# Patient Record
Sex: FEMALE | Race: WHITE | NOT HISPANIC OR LATINO | Employment: OTHER | URBAN - METROPOLITAN AREA
[De-identification: names, ages, dates, MRNs, and addresses within clinical notes are randomized per-mention and may not be internally consistent; named-entity substitution may affect disease eponyms.]

---

## 2022-12-15 ENCOUNTER — TREATMENT (OUTPATIENT)
Dept: PHYSICAL THERAPY | Facility: CLINIC | Age: 72
End: 2022-12-15

## 2022-12-15 DIAGNOSIS — M25.562 LEFT KNEE PAIN, UNSPECIFIED CHRONICITY: Primary | ICD-10-CM

## 2022-12-15 PROCEDURE — 97110 THERAPEUTIC EXERCISES: CPT | Performed by: PHYSICAL THERAPIST

## 2022-12-15 PROCEDURE — 97162 PT EVAL MOD COMPLEX 30 MIN: CPT | Performed by: PHYSICAL THERAPIST

## 2022-12-15 NOTE — PROGRESS NOTES
Physical Therapy Initial Evaluation and Plan of Care        Patient: Codie Garcia   : 1950  Diagnosis/ICD-10 Code:  Left knee pain, unspecified chronicity [M25.562]  Referring practitioner: LEENA Ham  Date of Initial Visit: 12/15/2022  Today's Date: 12/15/2022  Patient seen for 1 sessions           Subjective Questionnaire: LEFS: 54%      Subjective Evaluation    History of Present Illness  Mechanism of injury: Patient presents to physical therapy with cc of pain in left knee.  Reports that a few weeks ago she was walking her dog and mis-stepped on a walnut and rolled her ankle and twisted her knee.  Reports that at the time she could not put weight on her left leg due to pain.  States that since pain has decreased some and she can now put weight on her leg with mild pain.  Reports that pain is located in front of knee at times, then other times in on back/medial side of left knee.  Patient also reports intermittent weakness of left leg and pain in left posterior hip.  Patient had x-ray that showed some decreased joint space in left knee.  Wishes to have less pain in left knee with walking her dog.     Pain  Current pain rating: 3  Location: left knee  Quality: dull ache, sharp and discomfort  Relieving factors: ice and medications  Aggravating factors: stairs and ambulation  Progression: improved    Diagnostic Tests  X-ray: normal    Treatments  Previous treatment: medication  Patient Goals  Patient goals for therapy: decreased pain, increased motion, increased strength and return to sport/leisure activities             Objective          Observations   Left Knee   Positive for edema.     Additional Knee Observation Details  Medial joint line    Tenderness   Left Knee   Tenderness in the medial joint line and popliteal fossa.     Active Range of Motion   Left Knee   Flexion: 130 degrees   Extension: 0 degrees     Right Knee   Flexion: 127 degrees   Extension: 0 degrees     Strength/Myotome  Testing     Left Hip   Planes of Motion   Flexion: 4+  External rotation: 5  Internal rotation: 5    Right Hip   Planes of Motion   Flexion: 5  External rotation: 5  Internal rotation: 5    Left Knee   Flexion: 5  Extension: 4+    Right Knee   Normal strength    Left Ankle/Foot   Dorsiflexion: 5    Right Ankle/Foot   Dorsiflexion: 5    Tests     Lumbar     Left   Positive quadrant.     Left Knee   Positive medial Clara.   Negative anterior Lachman, bounce home, posterior drawer and valgus stress test at 30 degrees.           Assessment & Plan     Assessment  Impairments: impaired physical strength, lacks appropriate home exercise program and pain with function  Functional Limitations: walking and uncomfortable because of pain  Assessment details: Patient presents to physical therapy with s/s congruent with left knee pain.  Noted weakness in left knee and pain with movement of left knee. Noted joint line tenderness with left knee flexed.  Patient is appropriate for PT intervention in order to address these deficits so that she may complete ADL's with less pain/limitation.  Prognosis: good    Goals  Plan Goals: In two weeks, patient will report at least 25% reduction in pain level.   In two weeks, patient will demonstrate equal bilateral knee flexion AROM without pain in left knee.     In four weeks, patient will demonstrate 5/5 muscular strength in BLE's.   In four weeks, patient will report tolerating walking the dog without pain for at least 3 consecutive days.  In four weeks, patient will demonstrate proper technique with HEP.   In four weeks, patient will demonstrate decreased perceived disability by increasing score on LEFS by at least 15%.    Plan  Therapy options: will be seen for skilled therapy services  Planned modality interventions: cryotherapy and thermotherapy (hydrocollator packs)  Planned therapy interventions: manual therapy, neuromuscular re-education, soft tissue mobilization, strengthening,  stretching, therapeutic activities, joint mobilization, home exercise program and functional ROM exercises  Frequency: 2x week  Duration in weeks: 6        Manual Therapy:         mins  21944;  Therapeutic Exercise:    15     mins  24172;     Neuromuscular Mery:        mins  52145;    Therapeutic Activity:          mins  86367;     Gait Training:           mins  49439;     Ultrasound:          mins  03535;    Electrical Stimulation:         mins  74783 ( );  Dry Needling          mins self-pay    Timed Treatment:   15   mins   Total Treatment:     45   mins    PT SIGNATURE: Desean Reyes, JACK   DATE TREATMENT INITIATED: 12/15/2022    Initial Certification  Certification Period: 3/15/2023  I certify that the therapy services are furnished while this patient is under my care.  The services outlined above are required by this patient, and will be reviewed every 90 days.     PHYSICIAN: Chandni Hooks, APRN      DATE:     Please sign and return via fax to [phone].. Thank you, [facility].

## 2022-12-21 ENCOUNTER — TREATMENT (OUTPATIENT)
Dept: PHYSICAL THERAPY | Facility: CLINIC | Age: 72
End: 2022-12-21

## 2022-12-21 DIAGNOSIS — M25.562 LEFT KNEE PAIN, UNSPECIFIED CHRONICITY: Primary | ICD-10-CM

## 2022-12-21 PROCEDURE — 97110 THERAPEUTIC EXERCISES: CPT | Performed by: PHYSICAL THERAPIST

## 2022-12-21 PROCEDURE — 97140 MANUAL THERAPY 1/> REGIONS: CPT | Performed by: PHYSICAL THERAPIST

## 2022-12-21 NOTE — PROGRESS NOTES
Physical Therapy Daily Progress Note    Patient: Codie Garcia   : 1950  Diagnosis/ICD-10 Code:  Left knee pain, unspecified chronicity [M25.562]  Referring practitioner: LEENA Ham  Date of Initial Visit: Type: THERAPY  Noted: 12/15/2022  Today's Date: 2022  Patient seen for 2 sessions         Codie Garcia reports:  Left knee feeling well, however pain in left posterior hip and thigh present today.  Reports compliance with HEP, however feels bolster with positional distraction may have been too far forward.     Objective   See Exercise, Manual, and Modality Logs for complete treatment.       Assessment/Plan     Tolerates manual therapy treatment well this visit. Feels less sensitivity in left hip and thigh after treatment.  Demonstrates proper technique with newly added nerve flossing.  Patient to complete PD (2x daily) and seated nerve flossing (1x daily) over the next week and hold other home exercises.  Will assess symptoms next week.    Progress per Plan of Care           Manual Therapy:    30     mins  59080;  Therapeutic Exercise:    10     mins  48264;     Neuromuscular Mery:        mins  34045;    Therapeutic Activity:          mins  06876;     Gait Training:           mins  20511;     Ultrasound:          mins  78288;    Electrical Stimulation:         mins  94165 ( );  Dry Needling          mins self-pay    Timed Treatment:   40   mins   Total Treatment:     40   mins    Desean Reyes PT  Physical Therapist

## 2022-12-30 ENCOUNTER — TREATMENT (OUTPATIENT)
Dept: PHYSICAL THERAPY | Facility: CLINIC | Age: 72
End: 2022-12-30

## 2022-12-30 DIAGNOSIS — M25.562 LEFT KNEE PAIN, UNSPECIFIED CHRONICITY: Primary | ICD-10-CM

## 2022-12-30 PROCEDURE — 97110 THERAPEUTIC EXERCISES: CPT | Performed by: PHYSICAL THERAPIST

## 2022-12-30 PROCEDURE — 97012 MECHANICAL TRACTION THERAPY: CPT | Performed by: PHYSICAL THERAPIST

## 2022-12-30 PROCEDURE — 97140 MANUAL THERAPY 1/> REGIONS: CPT | Performed by: PHYSICAL THERAPIST

## 2022-12-30 NOTE — PROGRESS NOTES
Physical Therapy Daily Progress Note      Patient: Codie Garcia   : 1950  Diagnosis/ICD-10 Code:  Left knee pain, unspecified chronicity [M25.562]  Referring practitioner: LEENA Ham  Date of Initial Visit: Type: THERAPY  Noted: 12/15/2022  Today's Date: 2022  Patient seen for 3 sessions         Codie Garcia reports:  Left leg has been sore the past few days.  Reports intermittently completing positional distraction.     Objective   See Exercise, Manual, and Modality Logs for complete treatment.       Assessment/Plan    Tolerates mechanical traction well this visit.  Patient reports less pain in lower back and LLE after today's treatment.  Demonstrates proper technique with seated sciatic nerve flossing (HEP).    Progress per Plan of Care           Manual Therapy:    10     mins  01431;  Therapeutic Exercise:    15     mins  05105;     Neuromuscular Mery:        mins  48546;    Therapeutic Activity:          mins  13481;     Gait Training:           mins  36741;     Ultrasound:          mins  94280;    Electrical Stimulation:         mins  17569 ( );  Dry Needling          mins self-pay  Traction     15  mins  83545    Timed Treatment:   25   mins   Total Treatment:     40   mins    Desean Reyes PT  Physical Therapist                      
- - -

## 2023-01-06 ENCOUNTER — TREATMENT (OUTPATIENT)
Dept: PHYSICAL THERAPY | Facility: CLINIC | Age: 73
End: 2023-01-06
Payer: MEDICARE

## 2023-01-06 DIAGNOSIS — M25.562 LEFT KNEE PAIN, UNSPECIFIED CHRONICITY: Primary | ICD-10-CM

## 2023-01-06 PROCEDURE — 97140 MANUAL THERAPY 1/> REGIONS: CPT | Performed by: PHYSICAL THERAPIST

## 2023-01-06 PROCEDURE — 97012 MECHANICAL TRACTION THERAPY: CPT | Performed by: PHYSICAL THERAPIST

## 2023-01-06 PROCEDURE — 97110 THERAPEUTIC EXERCISES: CPT | Performed by: PHYSICAL THERAPIST

## 2023-01-06 NOTE — PROGRESS NOTES
Physical Therapy Daily Progress Note    Patient: Codie Garcia   : 1950  Diagnosis/ICD-10 Code:  Left knee pain, unspecified chronicity [M25.562]  Referring practitioner: LEENA Ham  Date of Initial Visit: Type: THERAPY  Noted: 12/15/2022  Today's Date: 2023  Patient seen for 4 sessions         Codie Garcia reports:  Reports burning pain in lower back and LLE has resolved, however still having tightness in both areas.  Felt much better after previous visit.     Objective   See Exercise, Manual, and Modality Logs for complete treatment.       Assessment/Plan     Feeling well after mechanical traction and exercise today.  Compliant with HEP.    Progress per Plan of Care           Manual Therapy:    10     mins  24718;  Therapeutic Exercise:    15     mins  02829;     Neuromuscular Mery:        mins  90645;    Therapeutic Activity:          mins  02155;     Gait Training:           mins  22677;     Ultrasound:          mins  73147;    Electrical Stimulation:         mins  08548 ( );  Dry Needling          mins self-pay  Traction    15  mins  77466    Timed Treatment:   25   mins   Total Treatment:     40   mins    Desean Reyes PT  Physical Therapist

## 2023-01-09 ENCOUNTER — TELEPHONE (OUTPATIENT)
Dept: PHYSICAL THERAPY | Facility: CLINIC | Age: 73
End: 2023-01-09

## 2023-01-09 ENCOUNTER — TREATMENT (OUTPATIENT)
Dept: PHYSICAL THERAPY | Facility: CLINIC | Age: 73
End: 2023-01-09
Payer: MEDICARE

## 2023-01-09 DIAGNOSIS — M25.562 LEFT KNEE PAIN, UNSPECIFIED CHRONICITY: Primary | ICD-10-CM

## 2023-01-09 PROCEDURE — 97012 MECHANICAL TRACTION THERAPY: CPT | Performed by: PHYSICAL THERAPIST

## 2023-01-09 PROCEDURE — 97110 THERAPEUTIC EXERCISES: CPT | Performed by: PHYSICAL THERAPIST

## 2023-01-09 PROCEDURE — 97140 MANUAL THERAPY 1/> REGIONS: CPT | Performed by: PHYSICAL THERAPIST

## 2023-01-09 NOTE — TELEPHONE ENCOUNTER
Caller: Codie Garcia    Relationship: Self    Best call back number: [phone]         What was the call regarding: WAS RETURNING YOUR CALL, TRIED TO REACH OFFICE NO ANSWER    Do you require a callback:YES

## 2023-01-09 NOTE — PROGRESS NOTES
Re-Assessment / Re-Certification      Patient: Codie Garcia   : 1950  Diagnosis/ICD-10 Code:  Left knee pain, unspecified chronicity [M25.562]  Referring practitioner: LEENA Ham  Date of Initial Visit: Type: THERAPY  Noted: 12/15/2022  Today's Date: 2023  Patient seen for 5 sessions      Subjective:   Codie Garcia reports:  Left leg feeling much better.  Still having mild soreness in posterior left hip that extends to posterior left knee, however has not had burning or trouble sleeping due to pain.    Subjective Questionnaire: LEFS: 51%  Clinical Progress: improved  Home Program Compliance: Yes  Treatment has included: therapeutic exercise, manual therapy, traction and moist heat    Subjective Evaluation    Pain  Current pain rating: 3         Objective          Active Range of Motion     Additional Active Range of Motion Details  Left sidebending 25% limited with pain on left side   All other planes wnl      Assessment & Plan       Plan  Frequency: 2x week  Duration in weeks: 4      Progress toward previous goals: Partially Met     Goals:   In two weeks, patient will report at least 25% reduction in pain level. met  In two weeks, patient will demonstrate equal bilateral knee flexion AROM without pain in left knee. met    In four weeks, patient will demonstrate 5/5 muscular strength in BLE's. met  In four weeks, patient will report tolerating walking the dog without pain for at least 3 consecutive days.  NM  In four weeks, patient will demonstrate proper technique with HEP.  met  In four weeks, patient will demonstrate decreased perceived disability by increasing score on LEFS by at least 15%.  NM    Patient demonstrates meeting short term goals and 50% of LTG's at this time.  Has not return to walking dog on daily basis due to pain in left leg.  Patient reports significant reduction in intensity and frequency of left leg pain since starting PT intervention.  Patient is appropriate to  continue PT intervention in order to continue reducing pain in LLE so that she may return to walking without pain/limitation.    Recommendations: Continue as planned  Timeframe: 1 month  Prognosis to achieve goals: good    PT Signature: Desean Reyes, PT      Based upon review of the patient's progress and continued therapy plan, it is my medical opinion that Codie Garcia should continue physical therapy treatment at [facility]  [address] [place] [address] [address] [place] [place] [id number] [phone].    Signature: __________________________________  Chandni Hooks APRN    Manual Therapy:    10     mins  96482;  Therapeutic Exercise:    15     mins  86948;     Neuromuscular Mery:        mins  05525;    Therapeutic Activity:          mins  35125;     Gait Training:           mins  62487;     Ultrasound:          mins  81053;    Electrical Stimulation:         mins  32308 ( );  Dry Needling          mins self-pay  Traction    15   mins  15164    Timed Treatment:   25   mins   Total Treatment:     40   mins

## 2023-01-16 ENCOUNTER — TREATMENT (OUTPATIENT)
Dept: PHYSICAL THERAPY | Facility: CLINIC | Age: 73
End: 2023-01-16
Payer: MEDICARE

## 2023-01-16 DIAGNOSIS — M25.562 LEFT KNEE PAIN, UNSPECIFIED CHRONICITY: Primary | ICD-10-CM

## 2023-01-16 PROCEDURE — 97140 MANUAL THERAPY 1/> REGIONS: CPT | Performed by: PHYSICAL THERAPIST

## 2023-01-16 PROCEDURE — 97012 MECHANICAL TRACTION THERAPY: CPT | Performed by: PHYSICAL THERAPIST

## 2023-01-16 PROCEDURE — 97110 THERAPEUTIC EXERCISES: CPT | Performed by: PHYSICAL THERAPIST

## 2023-01-17 NOTE — PROGRESS NOTES
Physical Therapy Daily Progress Note    Patient: Codie Garcia   : 1950  Diagnosis/ICD-10 Code:  Left knee pain, unspecified chronicity [M25.562]  Referring practitioner: LEENA Ham  Date of Initial Visit: Type: THERAPY  Noted: 12/15/2022  Today's Date: 2023  Patient seen for 6 sessions         Codie Garcia reports:  Left leg continues to feel better.  Still having mild discomfort in left posterior hip and thigh.  Feels traction is helping after each visit.    Objective   See Exercise, Manual, and Modality Logs for complete treatment.       Assessment/Plan     Tolerates today's treatment well.  Noted improved hamstring flexibility in 90/90 position.  Patient progressing well towards goals.     Progress per Plan of Care           Manual Therapy:    10     mins  27146;  Therapeutic Exercise:    15     mins  97099;     Neuromuscular Mery:        mins  78792;    Therapeutic Activity:          mins  28642;     Gait Training:           mins  32830;     Ultrasound:          mins  20804;    Electrical Stimulation:         mins  30629 ( );  Dry Needling          mins self-pay  Traction    15  mins  70217    Timed Treatment:   25   mins   Total Treatment:     40   mins    Desean Reyes PT  Physical Therapist

## 2023-01-23 ENCOUNTER — TREATMENT (OUTPATIENT)
Dept: PHYSICAL THERAPY | Facility: CLINIC | Age: 73
End: 2023-01-23
Payer: MEDICARE

## 2023-01-23 DIAGNOSIS — M25.562 LEFT KNEE PAIN, UNSPECIFIED CHRONICITY: Primary | ICD-10-CM

## 2023-01-23 PROCEDURE — 97012 MECHANICAL TRACTION THERAPY: CPT | Performed by: PHYSICAL THERAPIST

## 2023-01-23 PROCEDURE — 97140 MANUAL THERAPY 1/> REGIONS: CPT | Performed by: PHYSICAL THERAPIST

## 2023-01-23 PROCEDURE — 97110 THERAPEUTIC EXERCISES: CPT | Performed by: PHYSICAL THERAPIST

## 2023-01-23 NOTE — PROGRESS NOTES
Physical Therapy Daily Progress Note    Patient: Codie Garcia   : 1950  Diagnosis/ICD-10 Code:  Left knee pain, unspecified chronicity [M25.562]  Referring practitioner: LEENA Ham  Date of Initial Visit: Type: THERAPY  Noted: 12/15/2022  Today's Date: 2023  Patient seen for 7 sessions         Codie Garcia reports:  Left leg feeling better.  Reports that she slipped on ice over the weekend.  No injuries reported at this time and reports that back and left leg still feels better.  Has some mild soreness in hips and knees.     Objective   See Exercise, Manual, and Modality Logs for complete treatment.       Assessment/Plan     Feeling well after mechanical traction and exercise today.  Noted improved flexibility with hamstring 90/90 test.  Tolerates exercise well.    Progress per Plan of Care           Manual Therapy:    10     mins  61633;  Therapeutic Exercise:    15     mins  97633;     Neuromuscular Mery:        mins  89615;    Therapeutic Activity:          mins  74713;     Gait Training:          mins  66472;     Ultrasound:          mins  64644;    Electrical Stimulation:         mins  31159 ( );  Dry Needling          mins self-pay  Traction     15  mins  46297    Timed Treatment:   25   mins   Total Treatment:     40   mins    Desean Reyes PT  Physical Therapist

## 2023-01-25 ENCOUNTER — TREATMENT (OUTPATIENT)
Dept: PHYSICAL THERAPY | Facility: CLINIC | Age: 73
End: 2023-01-25
Payer: MEDICARE

## 2023-01-25 DIAGNOSIS — M25.562 LEFT KNEE PAIN, UNSPECIFIED CHRONICITY: Primary | ICD-10-CM

## 2023-01-25 PROCEDURE — 97012 MECHANICAL TRACTION THERAPY: CPT | Performed by: PHYSICAL THERAPIST

## 2023-01-25 PROCEDURE — 97140 MANUAL THERAPY 1/> REGIONS: CPT | Performed by: PHYSICAL THERAPIST

## 2023-01-25 PROCEDURE — 97110 THERAPEUTIC EXERCISES: CPT | Performed by: PHYSICAL THERAPIST

## 2023-01-25 NOTE — PROGRESS NOTES
Physical Therapy Daily Progress Note      Patient: Codie Garcia   : 1950  Diagnosis/ICD-10 Code:  Left knee pain, unspecified chronicity [M25.562]  Referring practitioner: LEENA Ham  Date of Initial Visit: Type: THERAPY  Noted: 12/15/2022  Today's Date: 2023  Patient seen for 8 sessions         Codie Garcia reports:   Left leg feeling better.  Patient reports feeling better after visit earlier this week.    Objective   See Exercise, Manual, and Modality Logs for complete treatment.       Assessment/Plan     Tolerates traction well this visit.  Patient compliant with HEP.  Making good progress towards pain goals.    Progress per Plan of Care           Manual Therapy:    10     mins  02635;  Therapeutic Exercise:    15     mins  24966;     Neuromuscular Mery:        mins  30118;    Therapeutic Activity:          mins  36830;     Gait Training:           mins  89073;     Ultrasound:          mins  35419;    Electrical Stimulation:         mins  78739 (MC );  Dry Needling         mins self-pay  Traction    15  mins  28348    Timed Treatment:   25   mins   Total Treatment:     40   mins    Desean Reyes PT  Physical Therapist

## 2023-01-30 ENCOUNTER — TREATMENT (OUTPATIENT)
Dept: PHYSICAL THERAPY | Facility: CLINIC | Age: 73
End: 2023-01-30
Payer: MEDICARE

## 2023-01-30 DIAGNOSIS — M25.562 LEFT KNEE PAIN, UNSPECIFIED CHRONICITY: Primary | ICD-10-CM

## 2023-01-30 PROCEDURE — 97012 MECHANICAL TRACTION THERAPY: CPT | Performed by: PHYSICAL THERAPIST

## 2023-01-30 PROCEDURE — 97110 THERAPEUTIC EXERCISES: CPT | Performed by: PHYSICAL THERAPIST

## 2023-01-30 NOTE — PROGRESS NOTES
Physical Therapy Daily Progress Note    Patient: Codie Garcia   : 1950  Diagnosis/ICD-10 Code:  Left knee pain, unspecified chronicity [M25.562]  Referring practitioner: LEENA Ham  Date of Initial Visit: Type: THERAPY  Noted: 12/15/2022  Today's Date: 2023  Patient seen for 9 sessions         Codie Garcia reports: Left leg continues to feel better.  Reports no burning pain in over a week.  Only reports mild pain in left hip and knee at times with activity.    Objective   See Exercise, Manual, and Modality Logs for complete treatment.       Assessment/Plan    Tolerates traction well this visit.  Patient demonstrates proper technique with home exercises.  Patient to continue with I HEP due to plateau in progress.    Progress per Plan of Care           Manual Therapy:         mins  40548;  Therapeutic Exercise:    25     mins  96880;     Neuromuscular Mery:        mins  21360;    Therapeutic Activity:          mins  26788;     Gait Training:           mins  25170;     Ultrasound:         mins  66471;    Electrical Stimulation:         mins  07961 ( );  Dry Needling          mins self-pay  Traction    15  mins  78183    Timed Treatment:   25   mins   Total Treatment:     40   mins    Desean Reyes PT  Physical Therapist